# Patient Record
Sex: FEMALE | Race: WHITE
[De-identification: names, ages, dates, MRNs, and addresses within clinical notes are randomized per-mention and may not be internally consistent; named-entity substitution may affect disease eponyms.]

---

## 2019-03-05 ENCOUNTER — HOSPITAL ENCOUNTER (EMERGENCY)
Dept: HOSPITAL 43 - DL.ED | Age: 54
Discharge: HOME | End: 2019-03-05
Payer: MEDICAID

## 2019-03-05 VITALS — SYSTOLIC BLOOD PRESSURE: 129 MMHG | DIASTOLIC BLOOD PRESSURE: 94 MMHG

## 2019-03-05 DIAGNOSIS — Z90.710: ICD-10-CM

## 2019-03-05 DIAGNOSIS — S06.0X0A: Primary | ICD-10-CM

## 2019-03-05 DIAGNOSIS — Z88.1: ICD-10-CM

## 2019-03-05 DIAGNOSIS — J44.1: ICD-10-CM

## 2019-03-05 DIAGNOSIS — S00.81XA: ICD-10-CM

## 2019-03-05 DIAGNOSIS — S60.812A: ICD-10-CM

## 2019-03-05 DIAGNOSIS — Z79.82: ICD-10-CM

## 2019-03-05 DIAGNOSIS — F32.9: ICD-10-CM

## 2019-03-05 DIAGNOSIS — Z88.8: ICD-10-CM
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
## 2019-03-05 NOTE — EDM.PDOC
ED HPI GENERAL MEDICAL PROBLEM





- General


Source of Information: Reports: Patient, RN, RN Notes Reviewed


History Limitations: Reports: No Limitations





- History of Present Illness


Onset: Today


Onset Date: 03/05/19


Onset Time: 11:00


Location: Reports: Head, Upper Extremity, Right (pain promixal to elbow), Lower 

Extremity, Right (abrasion to left wrist)


Improves with: Reports: None


Worsens with: Reports: None


Associated Symptoms: Reports: No Other Symptoms


Treatments PTA: Reports: NSAIDS


  ** Headache


Pain Score (Numeric/FACES): 3





<Saira Huizar - Last Filed: 03/05/19 14:37>





<Raul Vizcarra - Last Filed: 03/06/19 07:04>





- General


Chief Complaint: Head Injury


Stated Complaint: SLIPPED AND FELL, HIT HEAD ON BRICK WALL


Time Seen by Provider: 03/05/19 13:05





- History of Present Illness


INITIAL COMMENTS - FREE TEXT/NARRATIVE: 


Patient presents to the ED with complaints of head injury. At 1100 she slipped 

on ice and hit her head on the corner of a building. She hit her left forehead, 

landed on her right arm and left wrist. No loss of consciousness or memory. She 

is having right distal humerus pain that worsens with movement. She is having 

frontal headache that is worse on the left. She is having blurry vision and 

dizziness. She is having nausea but no vomiting. She takes a Aspirin 81mg 

daily.  (Saira Huizar)





- Related Data


 Allergies











Allergy/AdvReac Type Severity Reaction Status Date / Time


 


acetaminophen [From Tylenol] Allergy  Rash Verified 03/05/19 13:54


 


amoxicillin [Amoxicillin] Allergy  Rash Verified 03/05/19 13:54


 


paroxetine HCl [From Paxil] Allergy  Rash Verified 03/05/19 13:54


 


bleach Allergy  Respiratory Uncoded 03/05/19 13:54





   Distress  











Home Meds: 


 Home Meds





ALPRAZolam [Xanax] 0.5 mg PO ASDIRECTED PRN 02/24/14 [History]


Albuterol [Proventil HFA] 2 puff INH Q4H PRN 02/24/14 [History]


Fluticasone/Salmeterol [Advair 250-50 Diskus] 1 puff INH DAILY 02/24/14 [History

]


Aspirin 81 mg PO DAILY 01/24/16 [History]


Albuterol/Ipratropium [DuoNeb 3.0-0.5 MG/3 ML] 3 ml NEB Q6H PRN 03/05/19 [

History]











Past Medical History


HEENT History: Reports: Impaired Vision


Cardiovascular History: Reports: Other (See Below)


Other Cardiovascular History: Had some heart test.  Results unknow at this time.


Respiratory History: Reports: COPD


Other Genitourinary History: stress incontinence


Psychiatric History: Reports: Depression





- Past Surgical History


Female  Surgical History: Reports: Hysterectomy


Musculoskeletal Surgical History: Reports: Other (See Below)


Other Musculoskeletal Surgeries/Procedures:: right knee





<Saira Huizar R - Last Filed: 03/05/19 14:37>





Social & Family History





- Family History


Family Medical History: Noncontributory





- Caffeine Use


Caffeine Use: Reports: Coffee, Tea





<Saira Huizar R - Last Filed: 03/05/19 14:37>





ED ROS GENERAL





- Review of Systems


Review Of Systems: See Below


Constitutional: Reports: Fatigue.  Denies: Fever, Chills


HEENT: Reports: Vision Change (blurried vision. States "it looks like heat 

coming off [address]")


Respiratory: Reports: No Symptoms


Cardiovascular: Reports: No Symptoms


Endocrine: Reports: No Symptoms


GI/Abdominal: Reports: Nausea.  Denies: Vomiting


: Reports: No Symptoms


Musculoskeletal: Reports: Arm Pain (right arm pain proximal to elbow)


Skin: Reports: Other (abrasion to left wrist)


Neurological: Reports: Dizziness, Headache (left frontal ), Other (no loss of 

consciousness or memory ).  Denies: Confusion, Numbness, Seizure, Tingling, 

Tremors, Change in Speech


Psychiatric: Reports: No Symptoms


Hematologic/Lymphatic: Reports: No Symptoms


Immunologic: Reports: No Symptoms





<Saira Huizar R - Last Filed: 03/05/19 14:37>





- Review of Systems


Musculoskeletal: Reports: Arm Pain


Neurological: Denies: Paresthesia, Syncope, Trouble Speaking, Difficulty Walking

, Weakness, Gait Disturbance





<Raul Vizcarra G - Last Filed: 03/06/19 07:04>





ED EXAM, HEAD INJURY





- Physical Exam


Exam: See Below


Exam Limited By: No Limitations


General Appearance: Alert, WD/WN, No Apparent Distress, Other (Patient has 

memory of fall)


Head: Facial Abrasions (left temple)


Eyes: Bilateral Eye: Normal Fundi, Normal Inspection, Periorbital Changes, 

Vision Changes (blurried vision)


Ears: Normal External Exam, Normal Canal, Hearing Grossly Normal, Normal TMs.  

No: TM Erythema


Nose: Normal Inspection, Normal Mucousa, No Blood


Throat/Mouth: Normal Inspection, Normal Lips, Normal Teeth, Normal Gums, Normal 

Oropharynx, Normal Voice, No Airway Compromise


Neck: Non-Tender, Full Range of Motion, Normal Alignment, Normal Inspection.  No

: Spinous Processes Tender


Respiratory: No Respiratory Distress, Lungs Clear, Normal Breath Sounds, No 

Accessory Muscle Use, Chest Non-Tender


Cardiovascular: Normal Peripheral Pulses, Regular Rate, Rhythm, No Edema, No 

Gallop, No JVD, No Murmur, No Rub


GI/Abdominal Exam: Normal Bowel Sounds, Soft, Non-Tender


Extremities: Arm Pain (Right arm pain on palpation of distal humerus. No redness

, swelling, or bruising. ).  No: Increased Warmth, Redness


Neurologic: CNs II-XII nml As Tested, No Motor/Sensory Deficits, Alert, Normal 

Mood/Affect, Oriented x 3


Skin: Normal Color, Warm/Dry, Other (abrasion to left wrist and left forehead)





- [place] Coma Score


Best Eye Response (Nikki): (4) Open Spontaneously


Best Verbal Response (Nikki): (5) Oriented


Best Motor Response (Nikki): (6) Obeys Commands


Nikki Total: 15





<Saira Huizar R - Last Filed: 03/05/19 14:37>





- Physical Exam


Eyes: Bilateral Eye: Periorbital Changes (no changes), PERRL





<Raul Vizcarra G - Last Filed: 03/06/19 07:04>





Course





<Saira Huizar R - Last Filed: 03/05/19 14:37>





<Raul Vizcarra - Last Filed: 03/06/19 07:04>





- Vital Signs


Last Recorded V/S: 





 Last Vital Signs











Temp  36.6 C   03/05/19 13:11


 


Pulse  80   03/05/19 13:11


 


Resp  16   03/05/19 13:11


 


BP  129/94 H  03/05/19 14:00


 


Pulse Ox  100   03/05/19 13:11














- Radiology Interpretation


Free Text/Narrative:: 


left elbow x-ray showed no acute fracture


Head CT showed no evidence of acute intracranial abnormality (Saira Huizar

)





- Re-Assessments/Exams


Free Text/Narrative Re-Assessment/Exam: 





03/06/19 07:02


I personally performed or re-performed the physical examination and medical 

decision making. I have verified all student documentation or findings, 

including history, physical exam and/or medical decision making. (Raul Vizcarra)





Departure





- Departure


Time of Disposition: 13:59


Condition: Good





- Discharge Information


*PRESCRIPTION DRUG MONITORING PROGRAM REVIEWED*: Not Applicable


*COPY OF PRESCRIPTION DRUG MONITORING REPORT IN PATIENT CISCO: Not Applicable





<Saira Huizar - Last Filed: 03/05/19 14:37>





<Raul Vizcarra - Last Filed: 03/06/19 07:04>





- Departure


Disposition: Home, Self-Care 01


Clinical Impression: 


 Concussion with no loss of consciousness








- Discharge Information


Instructions:  Concussion, Adult, Easy-to-Read, Head Injury, Adult, Easy-to-Read


Referrals: 


PCP,Not In Area [Primary Care Provider] - 


Forms:  ED Department Discharge


Additional Instructions: 


Rest the brain (decrease screen time, rest, no strenuous activity)


Return to ED if symptoms worsens or not improving (excessive vomiting, severe 

headache)


Follow up with PCP if symptoms not improving or concerns





<Saira Huizar - Last Filed: 03/05/19 14:37>





<Raul Vizcarra - Last Filed: 03/06/19 07:04>





- Assessment/Plan


Assessment:: 


concussion (Saira Huizar)


Plan: 


Rest the brain (decrease screen time, rest, no strenuous activity)


Return to ED if symptoms worsens or not improving (excessive vomiting, severe 

headache)


Follow up with PCP if symptoms not improving or concerns (Saira Huizar)

## 2020-02-08 NOTE — EDM.PDOC
ED HPI GENERAL MEDICAL PROBLEM





- General


Stated Complaint: AMB


Time Seen by Provider: 02/08/20 19:40


Source of Information: Reports: Patient


History Limitations: Reports: No Limitations





- History of Present Illness


INITIAL COMMENTS - FREE TEXT/NARRATIVE: 





This 54 yo female patient was brought to the ED  by [facility] due to 

chest pain. The patient reports her symptoms started 3-4 days ago, but have 

seemed to get worse today. The patient reports she has a history of COPD, 

anxiety and GERD. The patient reports she has taken some Tums, but has not had 

any relief. The patient reports she used to take Omeprazole, but has not taken 

any recently. 


Onset Date: 02/05/20


Duration: Constant, Getting Worse


Location: Reports: Chest


Quality: Reports: Ache, Burning


Severity: Moderate


Improves with: Reports: None


Worsens with: Reports: None


Context: Reports: Other


Associated Symptoms: Reports: Chest Pain, Cough (chronic non-productive)


  ** Chest


Pain Score (Numeric/FACES): 0





- Related Data


 Allergies











Allergy/AdvReac Type Severity Reaction Status Date / Time


 


acetaminophen [From Tylenol] Allergy  Rash Verified 02/08/20 20:26


 


amoxicillin [Amoxicillin] Allergy  Rash Verified 02/08/20 20:26


 


paroxetine HCl [From Paxil] Allergy  Rash Verified 02/08/20 20:26


 


bleach Allergy  Respiratory Uncoded 02/08/20 20:26





   Distress  











Home Meds: 


 Home Meds





ALPRAZolam [Xanax] 0.5 mg PO ASDIRECTED PRN 02/24/14 [History]


Albuterol [Proventil HFA] 2 puff INH Q4H PRN 02/24/14 [History]


Fluticasone/Salmeterol [Advair 250-50 Diskus] 1 puff INH DAILY 02/24/14 [History

]


Aspirin 81 mg PO DAILY 01/24/16 [History]


Albuterol/Ipratropium [DuoNeb 3.0-0.5 MG/3 ML] 3 ml NEB Q6H PRN 03/05/19 [

History]


Echinacea   1 tab PO DAILY 02/08/20 [History]


Ibuprofen 200 mg PO QID PRN 02/08/20 [History]


Nitroglycerin [Nitrostat] 0.4 mg SL ASDIRECTED PRN 02/08/20 [History]











Past Medical History


HEENT History: Reports: Impaired Vision


Cardiovascular History: Reports: Other (See Below)


Other Cardiovascular History: Had some heart test.  Results unknow at this time.


Respiratory History: Reports: COPD


Other Genitourinary History: stress incontinence


Psychiatric History: Reports: Depression





- Past Surgical History


Female  Surgical History: Reports: Hysterectomy


Musculoskeletal Surgical History: Reports: Other (See Below)


Other Musculoskeletal Surgeries/Procedures:: right knee





Social & Family History





- Family History


Family Medical History: Noncontributory





- Caffeine Use


Caffeine Use: Reports: Coffee, Tea





ED ROS GENERAL





- Review of Systems


Review Of Systems: Comprehensive ROS is negative, except as noted in HPI.





ED EXAM, GENERAL





- Physical Exam


Exam: See Below


Exam Limited By: No Limitations


General Appearance: Alert, WD/WN, Anxious


Eye Exam: Bilateral Eye: EOMI, Normal Inspection, PERRL


Ears: Normal External Exam, Normal Canal, Hearing Grossly Normal, Normal TMs


Nose: Normal Inspection, Normal Mucosa, No Blood


Throat/Mouth: Normal Inspection, Normal Lips, Normal Teeth, Normal Gums, Normal 

Oropharynx, Normal Voice, No Airway Compromise


Head: Atraumatic, Normocephalic


Neck: Normal Inspection, Supple, Non-Tender, Full Range of Motion


Respiratory/Chest: No Respiratory Distress, Lungs Clear, Normal Breath Sounds, 

No Accessory Muscle Use, Chest Non-Tender


Cardiovascular: Normal Peripheral Pulses, Regular Rate, Rhythm, No Edema, No 

Gallop, No JVD, No Murmur, No Rub


GI/Abdominal: Normal Bowel Sounds, Soft, Non-Tender, No Organomegaly, No 

Distention, No Abnormal Bruit, No Mass


 (Female) Exam: Deferred


Rectal (Female) Exam: Deferred


Back Exam: Normal Inspection, Full Range of Motion, NT


Extremities: Normal Inspection, Normal Range of Motion, Non-Tender, Normal 

Capillary Refill, No Pedal Edema


Neurological: Alert, Oriented, CN II-XII Intact, Normal Cognition, Normal Gait, 

Normal Reflexes, No Motor/Sensory Deficits


Psychiatric: Anxious


Skin Exam: Warm, Dry, Intact, Normal Color, No Rash


Lymphatic: No Adenopathy





Course





- Vital Signs


Last Recorded V/S: 


 Last Vital Signs











Temp  35.9 C   02/08/20 19:35


 


Pulse  95   02/08/20 19:35


 


Resp  20   02/08/20 19:35


 


BP  141/87 H  02/08/20 19:35


 


Pulse Ox  95   02/08/20 19:35














- Orders/Labs/Meds


Orders: 


 Active Orders 24 hr











 Category Date Time Status


 


 EKG Documentation Completion [RC] URGENT Care  02/08/20 19:46 Active


 


 Chest 1V Frontal [CR] Urgent Exams  02/08/20 19:46 Taken


 


 Omeprazole Med  02/08/20 20:45 Once





 20 mg PO ONETIME ONE   











Labs: 


 Laboratory Tests











  02/08/20 02/08/20 Range/Units





  19:55 19:55 


 


WBC  10.3 H   (5.0-10.0)  10^3/uL


 


RBC  4.62   (4.2-5.4)  10^6/uL


 


Hgb  13.7   (12.0-16.0)  g/dL


 


Hct  39.8   (37.0-47.0)  %


 


MCV  86.1   ()  fL


 


MCH  29.7   (27.0-34.0)  pg


 


MCHC  34.4   (33.0-35.0)  g/dL


 


Plt Count  228   (150-450)  10^3/uL


 


Neut % (Auto)  56.2   (42.2-75.2)  %


 


Lymph % (Auto)  33.0   (20.5-50.1)  %


 


Mono % (Auto)  7.6   (2-8)  %


 


Eos % (Auto)  2.7   (1.0-3.0)  %


 


Baso % (Auto)  0.5   (0.0-1.0)  %


 


Sodium   137  (135-145)  mmol/L


 


Potassium   4.7  (3.6-5.0)  mmol/L


 


Chloride   100 L  (101-111)  mmol/L


 


Carbon Dioxide   28.0  (21.0-31.0)  mmol/L


 


Anion Gap   13.7  


 


BUN   22 H  (7-18)  mg/dL


 


Creatinine   0.9  (0.6-1.3)  mg/dL


 


Est Cr Clr Drug Dosing   TNP  


 


Estimated GFR (MDRD)   > 60  


 


BUN/Creatinine Ratio   24.44  


 


Glucose   93  ()  mg/dL


 


Calcium   9.2  (8.4-10.2)  mg/dl


 


Total Bilirubin   0.4  (0.2-1.0)  mg/dL


 


AST   16  (10-42)  IU/L


 


ALT   17  (10-60)  IU/L


 


Alkaline Phosphatase   55  ()  IU/L


 


Troponin I   0.03 H*  (0.00-0.02)  ng/ml


 


Total Protein   6.7  (6.7-8.2)  g/dl


 


Albumin   4.1  (3.2-5.5)  g/dl


 


Globulin   2.6  


 


Albumin/Globulin Ratio   1.58  














Departure





- Departure


Time of Disposition: 20:45


Disposition: Home, Self-Care 01


Condition: Fair


Clinical Impression: 


GERD (gastroesophageal reflux disease)


Qualifiers:


 Esophagitis presence: with esophagitis Qualified Code(s): K21.0 - Gastro-

esophageal reflux disease with esophagitis





Instructions:  Food Choices for Gastroesophageal Reflux Disease, Adult, Easy-to-

Read, Gastroesophageal Reflux Disease, Adult, Easy-to-Read


Forms:  ED Department Discharge


Care Plan Goals: 


The patient was advised of the examination, lab, x-ray and EKG results during 

the visit. The patient was given an oral dose of Omeprazole while in the ED. 

The patient was discharged with a script for Omeprazole (20 mg)  to take 1 by 

mouth daily 30 minutes before eating. If the patient has any additional 

symptoms or concerns, the patient should follow-up with her primary care 

facility or return to the emergency department. 





Sepsis Event Note





- Focused Exam


Vital Signs: 


 Vital Signs











  Temp Pulse Resp BP Pulse Ox


 


 02/08/20 19:35  35.9 C  95  20  141/87 H  95











Date Exam was Performed: 02/08/20


Time Exam was Performed: 20:45





- My Orders


Last 24 Hours: 


My Active Orders





02/08/20 19:46


EKG Documentation Completion [RC] URGENT 


Chest 1V Frontal [CR] Urgent 





02/08/20 20:45


Omeprazole   20 mg PO ONETIME ONE 














- Assessment/Plan


Last 24 Hours: 


My Active Orders





02/08/20 19:46


EKG Documentation Completion [RC] URGENT 


Chest 1V Frontal [CR] Urgent 





02/08/20 20:45


Omeprazole   20 mg PO ONETIME ONE